# Patient Record
Sex: MALE | Race: WHITE
[De-identification: names, ages, dates, MRNs, and addresses within clinical notes are randomized per-mention and may not be internally consistent; named-entity substitution may affect disease eponyms.]

---

## 2017-02-05 ENCOUNTER — HOSPITAL ENCOUNTER (EMERGENCY)
Dept: HOSPITAL 17 - PHED | Age: 67
Discharge: HOME | End: 2017-02-05
Payer: MEDICARE

## 2017-02-05 VITALS — WEIGHT: 258.16 LBS | HEIGHT: 71 IN | BODY MASS INDEX: 36.14 KG/M2

## 2017-02-05 VITALS
TEMPERATURE: 99.2 F | HEART RATE: 67 BPM | RESPIRATION RATE: 16 BRPM | SYSTOLIC BLOOD PRESSURE: 148 MMHG | OXYGEN SATURATION: 96 % | DIASTOLIC BLOOD PRESSURE: 81 MMHG

## 2017-02-05 VITALS — DIASTOLIC BLOOD PRESSURE: 79 MMHG | SYSTOLIC BLOOD PRESSURE: 147 MMHG

## 2017-02-05 DIAGNOSIS — I10: ICD-10-CM

## 2017-02-05 DIAGNOSIS — Z79.01: ICD-10-CM

## 2017-02-05 DIAGNOSIS — J44.9: ICD-10-CM

## 2017-02-05 DIAGNOSIS — E78.00: ICD-10-CM

## 2017-02-05 DIAGNOSIS — M54.5: Primary | ICD-10-CM

## 2017-02-05 DIAGNOSIS — K21.9: ICD-10-CM

## 2017-02-05 DIAGNOSIS — E11.9: ICD-10-CM

## 2017-02-05 DIAGNOSIS — Z87.891: ICD-10-CM

## 2017-02-05 DIAGNOSIS — G89.29: ICD-10-CM

## 2017-02-05 PROCEDURE — 99283 EMERGENCY DEPT VISIT LOW MDM: CPT

## 2017-02-05 PROCEDURE — 96372 THER/PROPH/DIAG INJ SC/IM: CPT

## 2017-02-05 NOTE — PD
HPI


Chief Complaint:  Pain: Acute or Chronic


Time Seen by Provider:  19:31


Travel History


International Travel<30 days:  No


Contact w/Intl Traveler<30days:  No


Traveled to known affect area:  No





History of Present Illness


HPI


The patient is a 67-year-old male with a history of chronic back pain who 

complains of left back pain radiating to the left abdominal area.  The patient 

has had multiple MRIs, his latest was in November of the back and nothing 

surgical, be done about this.  The patient states he took 2 hydrocodone tablets 

today and the pain is starting to go away, the hydrocodone is taking effect.  

He is followed by Dr. Queen for this pain.  The hydrocodone was written by 

Dr. Hossein Hughes.  The patient has been seen multiple times in the last few 

months for this pain.





PFSH


Past Medical History


Hx Anticoagulant Therapy:  Yes (asa 81mg)


Arthritis:  Yes


Anxiety:  Yes


Depression:  No


Heart Rhythm Problems:  No


Cancer:  No


Cardiovascular Problems:  Yes (htn on med)


High Cholesterol:  Yes


Chemotherapy:  No


Chest Pain:  No


COPD:  Yes


Cerebrovascular Accident:  No


Diabetes:  Yes (type 2)


Patient Takes Glucophage:  No


Diminished Hearing:  No


Endocrine:  Yes


Gastrointestinal Disorders:  Yes


GERD:  Yes


Glaucoma:  No


Genitourinary:  No


Hepatitis:  No


Hiatal Hernia:  No


Hypertension:  Yes


Musculoskeletal:  Yes (scoliosis, DDD)


Neurologic:  Yes


Psychiatric:  Yes


Reproductive:  No


Respiratory:  Yes (copd)


Integumentary:  No


Immunizations Current:  Yes


Thyroid Disease:  No


Ulcer:  Yes


Tetanus Vaccination:  > 5 Years





Past Surgical History


Hysterectomy:  No


Thoracic Surgery:  No


Other Surgery:  Yes (COLONOSCOPY NOV 1ST)





Social History


Alcohol Use:  Yes (OCCASSIONAL)


Tobacco Use:  No (quit July, 2016)


Substance Use:  No





Allergies-Medications


(Allergen,Severity, Reaction):  


Coded Allergies:  


     No Known Allergies (Unverified , 2/5/17)


Reported Meds & Prescriptions





Reported Meds & Active Scripts


Active


Lortab (Hydrocodone-Acetaminophen) 5-325 Mg Tab 1 Tab PO Q6H PRN


Reported


Alprazolam 0.5 Mg Tab 0.5 Mg PO HS PRN


Losartan (Losartan Potassium) 100 Mg Tab 100 Mg PO DAILY


Norvasc (Amlodipine Besylate) 5 Mg Tab 5 Mg PO DAILY


Aspirin 81 Mg Chew 81 Mg CHEW DAILY


Multi Complete (Multiple Vitamins W/ Minerals) 1 Cap Cap 1 Cap PO DAILY


Niacin 500 Mg Tab 500 Mg PO TID


Fenofibrate 48 Mg Tab 160 Mg PO HS


Omeprazole 20 Mg Tab 20 Mg PO HS


Symbicort Inh (Budesonide/Formoterol Fumarate) 160-4.5 Mcg/Act Aero 2 Puff INH 

Q12HR


Glimepiride 4 Mg Tab 4 Mg PO DAILY


     Take with breakfast or first main meal


Hydrochlorothiazide 25 Mg Tab 25 Mg PO DAILY


Diclofenac Sodium DR (Diclofenac Sodium) 75 Mg Tabdr 75 Mg PO BID


Metoprolol Tartrate 100 Mg Tab 50 Mg PO BID


Pravastatin 40 Mg Tab 40 Mg PO HS








Review of Systems


Except as stated in HPI:  all other systems reviewed are Neg





Physical Exam


Narrative


GENERAL: The patient is alert, oriented 3 in moderate apparent distress with 

his back pain.  His blood pressure is 148/81, temperature 99.2 but the rest the 

vital signs are normal.


SKIN: Warm and dry.  No skin rash is seen.  There is an apparent lipoma/fatty 

tumor on the back that the wife is concerned about that the patient has no 

symptoms with this.


HEAD: Atraumatic. Normocephalic. 


EYES: Pupils equal and round. No scleral icterus. No injection or drainage. 


ENT: No nasal bleeding or discharge.  Mucous membranes pink and moist.


NECK: Trachea midline. No JVD. 


CARDIOVASCULAR: Regular rate and rhythm.  No murmur appreciated.


RESPIRATORY: No accessory muscle use. Clear to auscultation. Breath sounds 

equal bilaterally. 


GASTROINTESTINAL: Abdomen soft, non-tender, nondistended. Hepatic and splenic 

margins not palpable. 


MUSCULOSKELETAL: No obvious deformities. No clubbing.  No cyanosis.  No edema.  

There is tenderness over the left back diffusely, well away from the thoracic 

or lumbar spine.  No deformities noted.  Straight leg raising is normal


NEUROLOGICAL: Awake and alert. No obvious cranial nerve deficits.  Motor 

grossly within normal limits. Normal speech.


PSYCHIATRIC: Appropriate mood and affect; insight and judgment normal.





Data


Data


Last Documented VS





Vital Signs








  Date Time  Temp Pulse Resp B/P Pulse Ox O2 Delivery O2 Flow Rate FiO2


 


2/5/17 18:31 99.2 67 16 148/81 96   











MDM


Medical Decision Making


Medical Screen Exam Complete:  Yes


Emergency Medical Condition:  Yes


Medical Record Reviewed:  Yes


Differential Diagnosis


Acute exacerbation of chronic back pain, herniated nucleus pulposus


Narrative Course


The patient has acute exacerbation of chronic back pain.  Apparently, there is 

no surgical solution for this and the patient will need to follow-up with Dr. Queen for pain control.





Plan: The patient can take the medication written by Dr. Hughes.  I will add 

Flexeril and Mobic to his pain regimen.  He needs to follow-up with Dr. Queen.





***Additional Instructions:


As we discussed, follow-up with Dr. Queen.  The Flexeril is 3 times daily 

and the Mobic is once daily.


***Med/Other Pt SpecificInfo:  Prescription(s) given


Scripts


Cyclobenzaprine (Flexeril)10 Mg Tab10 Mg PO TID  #60 TAB  Ref 0


   Prov:Moris Pisano MD         2/5/17 


Meloxicam (Mobic)15 Mg Tab15 Mg PO DAILY  #30 TAB  Ref 0


   Prov:Moris Pisano MD         2/5/17


Disposition:  01 DISCHARGE HOME


Condition:  Stable








Mrois Pisano MD Feb 5, 2017 20:06

## 2017-03-13 ENCOUNTER — HOSPITAL ENCOUNTER (EMERGENCY)
Dept: HOSPITAL 17 - NEPC | Age: 67
Discharge: HOME | End: 2017-03-13
Payer: MEDICARE

## 2017-03-13 VITALS
HEART RATE: 78 BPM | OXYGEN SATURATION: 95 % | SYSTOLIC BLOOD PRESSURE: 167 MMHG | RESPIRATION RATE: 15 BRPM | DIASTOLIC BLOOD PRESSURE: 74 MMHG | TEMPERATURE: 97.9 F

## 2017-03-13 VITALS
RESPIRATION RATE: 18 BRPM | DIASTOLIC BLOOD PRESSURE: 76 MMHG | OXYGEN SATURATION: 98 % | SYSTOLIC BLOOD PRESSURE: 148 MMHG | HEART RATE: 74 BPM

## 2017-03-13 VITALS — HEIGHT: 71 IN | WEIGHT: 253.53 LBS | BODY MASS INDEX: 35.49 KG/M2

## 2017-03-13 DIAGNOSIS — L53.9: ICD-10-CM

## 2017-03-13 DIAGNOSIS — Z86.69: ICD-10-CM

## 2017-03-13 DIAGNOSIS — Z79.4: ICD-10-CM

## 2017-03-13 DIAGNOSIS — E11.9: ICD-10-CM

## 2017-03-13 DIAGNOSIS — Z87.09: ICD-10-CM

## 2017-03-13 DIAGNOSIS — R60.0: Primary | ICD-10-CM

## 2017-03-13 DIAGNOSIS — Z86.59: ICD-10-CM

## 2017-03-13 DIAGNOSIS — Z87.19: ICD-10-CM

## 2017-03-13 DIAGNOSIS — Z87.39: ICD-10-CM

## 2017-03-13 DIAGNOSIS — Z79.82: ICD-10-CM

## 2017-03-13 DIAGNOSIS — E78.5: ICD-10-CM

## 2017-03-13 DIAGNOSIS — I10: ICD-10-CM

## 2017-03-13 DIAGNOSIS — Z87.891: ICD-10-CM

## 2017-03-13 LAB
ANION GAP SERPL CALC-SCNC: 9 MEQ/L (ref 5–15)
BASOPHILS # BLD AUTO: 0.1 TH/MM3 (ref 0–0.2)
BASOPHILS NFR BLD: 1 % (ref 0–2)
BUN SERPL-MCNC: 27 MG/DL (ref 7–18)
CHLORIDE SERPL-SCNC: 104 MEQ/L (ref 98–107)
EOSINOPHIL # BLD: 0.1 TH/MM3 (ref 0–0.4)
EOSINOPHIL NFR BLD: 1.9 % (ref 0–4)
ERYTHROCYTE [DISTWIDTH] IN BLOOD BY AUTOMATED COUNT: 12.9 % (ref 11.6–17.2)
GFR SERPLBLD BASED ON 1.73 SQ M-ARVRAT: 48 ML/MIN (ref 89–?)
HCO3 BLD-SCNC: 24.8 MEQ/L (ref 21–32)
HCT VFR BLD CALC: 40.6 % (ref 39–51)
HEMO FLAGS: (no result)
LYMPHOCYTES # BLD AUTO: 1.9 TH/MM3 (ref 1–4.8)
LYMPHOCYTES NFR BLD AUTO: 32.3 % (ref 9–44)
MCH RBC QN AUTO: 29.9 PG (ref 27–34)
MCHC RBC AUTO-ENTMCNC: 34.7 % (ref 32–36)
MCV RBC AUTO: 86.3 FL (ref 80–100)
MONOCYTES NFR BLD: 6.6 % (ref 0–8)
NEUTROPHILS # BLD AUTO: 3.5 TH/MM3 (ref 1.8–7.7)
NEUTROPHILS NFR BLD AUTO: 58.2 % (ref 16–70)
PLATELET # BLD: 283 TH/MM3 (ref 150–450)
POTASSIUM SERPL-SCNC: 4.1 MEQ/L (ref 3.5–5.1)
RBC # BLD AUTO: 4.7 MIL/MM3 (ref 4.5–5.9)
SODIUM SERPL-SCNC: 138 MEQ/L (ref 136–145)
WBC # BLD AUTO: 5.9 TH/MM3 (ref 4–11)

## 2017-03-13 PROCEDURE — 80048 BASIC METABOLIC PNL TOTAL CA: CPT

## 2017-03-13 PROCEDURE — 93971 EXTREMITY STUDY: CPT

## 2017-03-13 PROCEDURE — 85025 COMPLETE CBC W/AUTO DIFF WBC: CPT

## 2017-03-13 NOTE — RADRPT
EXAM DATE/TIME:  03/13/2017 14:42 

 

[facility] COMPARISON:     

No previous studies available for comparison.

 

EXTERNAL COMPARISON :    

[facility], US LEG, LEFT VENOUS DOPPLER, March 7, 2017

 

 

INDICATIONS :                

Left leg pain. 

            

 

MEDICAL HISTORY :     

Hypercholesterolemia.  Hypertension.  Chronic obstructive pulmonary disease.  Anticoagulant therapy. 
Dyspnea. Emphysema. GERD. Arthritis. Diabetes.  

 

SURGICAL HISTORY :         

Colonoscopy. 

 

ENCOUNTER:     

Subsequent

 

ACUITY:     

1 week

 

PAIN SCORE:      

2/10

 

LOCATION:      

Left  leg. 

            

                      

 

TECHNIQUE:     

Venous ultrasound of the leg was performed from the inguinal ligament to the proximal calf.  Real-bola
e, color Doppler and spectral tracing, compression and augmentation techniques were used.  

 

FINDINGS:     

There is normal compressibility of the deep venous system from the inguinal region to the proximal ca
lf.  No echogenic clot is seen in the lumen of the common femoral, femoral, popliteal, and posterior 
tibial veins.  There is a normal response of the venous system to proximal and distal augmentation an
d respiration.  

 

CONCLUSION:     No DVT.

 

 

 

 Semaj Herman MD on March 13, 2017 at 15:36           

Board Certified Radiologist.

 This report was verified electronically.

## 2017-03-13 NOTE — PD
HPI


Chief Complaint:  Edema


Time Seen by Provider:  14:36


Travel History


International Travel<30 days:  No


Contact w/Intl Traveler<30days:  No


Traveled to known affect area:  No





History of Present Illness


HPI


Patient is a 67-year-old male presenting to emergency department evaluation of 

left lower extremity swelling and redness.  Patient states this has been 

ongoing for 10 days, he was prescribed Bactrim and indomethacin per his primary 

doctor.  His primary care provider is Dr. Hossein Hughes.  Patient was told 

initially that it was cellulitis and prescribed Bactrim then he was told it was 

gout and prescribed indomethacin.  He states he had an ultrasound performed at 

[facility] last Thursday or Friday which was negative per his report.  

He denies any significant pain.  He states that his toes were much more swollen 

than they currently are, he was told by his endocrinologist that he did not 

have any fungal infection on his feet.  Patient reports that the swelling is 

better first thing in the morning and gets worse by the end of the day.  His 

past medical history includes type 2 diabetes, currently insulin-dependent.  

Hypertension, hyperlipidemia, COPD.





PFSH


Past Medical History


Hx Anticoagulant Therapy:  Yes (asa 81mg)


Arthritis:  Yes


Anxiety:  Yes


Depression:  No


Heart Rhythm Problems:  No


Cancer:  No


Cardiovascular Problems:  Yes (htn on med)


High Cholesterol:  Yes


Chemotherapy:  No


Chest Pain:  No


COPD:  Yes


Cerebrovascular Accident:  No


Diabetes:  Yes


Diminished Hearing:  No


Endocrine:  Yes


Gastrointestinal Disorders:  Yes


GERD:  Yes


Glaucoma:  No


Genitourinary:  No


Hepatitis:  No


Hiatal Hernia:  No


Hypertension:  Yes


Musculoskeletal:  Yes (scoliosis, DDD)


Neurologic:  Yes


Psychiatric:  Yes


Reproductive:  No


Respiratory:  Yes (copd)


Integumentary:  No


Immunizations Current:  Yes


Thyroid Disease:  No


Ulcer:  Yes





Past Surgical History


Hysterectomy:  No


Thoracic Surgery:  No


Other Surgery:  Yes (COLONOSCOPY NOV 1ST)





Social History


Alcohol Use:  Yes (OCCASSIONAL)


Tobacco Use:  No (quit July, 2016)


Substance Use:  No





Allergies-Medications


(Allergen,Severity, Reaction):  


Coded Allergies:  


     No Known Allergies (Unverified , 3/13/17)


Reported Meds & Prescriptions





Reported Meds & Active Scripts


Active


Flexeril (Cyclobenzaprine HCl) 10 Mg Tab 10 Mg PO TID


Mobic (Meloxicam) 15 Mg Tab 15 Mg PO DAILY


Lortab (Hydrocodone-Acetaminophen) 5-325 Mg Tab 1 Tab PO Q6H PRN


Reported


Alprazolam 0.5 Mg Tab 0.5 Mg PO HS PRN


Losartan (Losartan Potassium) 100 Mg Tab 100 Mg PO DAILY


Norvasc (Amlodipine Besylate) 5 Mg Tab 5 Mg PO DAILY


Aspirin 81 Mg Chew 81 Mg CHEW DAILY


Multi Complete (Multiple Vitamins W/ Minerals) 1 Cap Cap 1 Cap PO DAILY


Niacin 500 Mg Tab 500 Mg PO TID


Fenofibrate 48 Mg Tab 160 Mg PO HS


Omeprazole 20 Mg Tab 20 Mg PO HS


Symbicort Inh (Budesonide/Formoterol Fumarate) 160-4.5 Mcg/Act Aero 2 Puff INH 

Q12HR


Glimepiride 4 Mg Tab 4 Mg PO DAILY


     Take with breakfast or first main meal


Hydrochlorothiazide 25 Mg Tab 25 Mg PO DAILY


Diclofenac Sodium DR (Diclofenac Sodium) 75 Mg Tabdr 75 Mg PO BID


Metoprolol Tartrate 100 Mg Tab 50 Mg PO BID


Pravastatin 40 Mg Tab 40 Mg PO HS








Review of Systems


Except as stated in HPI:  all other systems reviewed are Neg


Cardiovascular:  Positive: Edema,  No: Chest Pain or Discomfort


Respiratory:  No: Shortness of Breath


Skin:  Positive Rash, Positive Change in Pigmentation





Physical Exam


Narrative


GENERAL: Obese, well-developed, alert  male.  Resting comfortably in 

no acute distress.


SKIN: Warm and dry. 


HEAD: Atraumatic. Normocephalic. 


EYES: Pupils equal and round. No scleral icterus. No injection or drainage. 


ENT: No nasal bleeding or discharge.  Mucous membranes pink and moist.


NECK: Trachea midline. No JVD. 


CARDIOVASCULAR: Regular rate and rhythm.  No murmur appreciated.


RESPIRATORY: No accessory muscle use. Clear to auscultation. Breath sounds 

equal bilaterally. 


GASTROINTESTINAL: Abdomen soft, non-tender, nondistended. Hepatic and splenic 

margins not palpable. 


MUSCULOSKELETAL: No obvious deformities. No clubbing.  No cyanosis.  1+ edema 

to left lower extremity, erythema noted.  Flaking dry skin to the interdigital 

spaces on the left foot as well as the medial aspect of the left foot area the 

pedal pulses, brisk capillary refill.


NEUROLOGICAL: Awake and alert. No obvious cranial nerve deficits.  Motor 

grossly within normal limits. Normal speech.


PSYCHIATRIC: Appropriate mood and affect; insight and judgment normal.





Data


Data


Last Documented VS





Vital Signs








  Date Time  Temp Pulse Resp B/P Pulse Ox O2 Delivery O2 Flow Rate FiO2


 


3/13/17 16:44  74 18 148/76 98 Room Air  


 


3/13/17 13:09 97.9       








Orders





 Us Leg Venous Doppler (3/13/17 )


Complete Blood Count With Diff (3/13/17 14:34)


Basic Metabolic Panel (Bmp) (3/13/17 14:34)





Labs








 Laboratory Tests








Test 3/13/17





 15:35


 


White Blood Count 5.9 TH/MM3


 


Red Blood Count 4.70 MIL/MM3


 


Hemoglobin 14.1 GM/DL


 


Hematocrit 40.6 %


 


Mean Corpuscular Volume 86.3 FL


 


Mean Corpuscular Hemoglobin 29.9 PG


 


Mean Corpuscular Hemoglobin 34.7 %





Concent 


 


Red Cell Distribution Width 12.9 %


 


Platelet Count 283 TH/MM3


 


Mean Platelet Volume 7.2 FL


 


Neutrophils (%) (Auto) 58.2 %


 


Lymphocytes (%) (Auto) 32.3 %


 


Monocytes (%) (Auto) 6.6 %


 


Eosinophils (%) (Auto) 1.9 %


 


Basophils (%) (Auto) 1.0 %


 


Neutrophils # (Auto) 3.5 TH/MM3


 


Lymphocytes # (Auto) 1.9 TH/MM3


 


Monocytes # (Auto) 0.4 TH/MM3


 


Eosinophils # (Auto) 0.1 TH/MM3


 


Basophils # (Auto) 0.1 TH/MM3


 


CBC Comment DIFF FINAL 


 


Differential Comment  


 


Sodium Level 138 MEQ/L


 


Potassium Level 4.1 MEQ/L


 


Chloride Level 104 MEQ/L


 


Carbon Dioxide Level 24.8 MEQ/L


 


Anion Gap 9 MEQ/L


 


Blood Urea Nitrogen 27 MG/DL


 


Creatinine 1.47 MG/DL


 


Estimat Glomerular Filtration 48 ML/MIN





Rate 


 


Random Glucose 171 MG/DL


 


Calcium Level 9.2 MG/DL














MDM


Medical Decision Making


Medical Screen Exam Complete:  Yes


Emergency Medical Condition:  Yes


Interpretation(s)





Vital Signs








  Date Time  Temp Pulse Resp B/P Pulse Ox O2 Delivery O2 Flow Rate FiO2


 


3/13/17 13:09 97.9 78 15 167/74 95   








Differential Diagnosis


DVT versus cellulitis versus venous insufficiency versus tinea versus other


Narrative Course


Patient is a 67-year-old male presenting to the emergency department for 

evaluation of left lower extremity erythema and edema.  Patient's been 

evaluated by his primary doctor and he is currently on Bactrim with 1 day left 

in the prescribed course of therapy.  He does report some improvement in the 

swelling to his left lower leg.  Physical exam is consistent with a tinea to 

the left foot and interdigital spaces.  Ultrasound is ordered and pending.  We'

ll check CBC and BMP as well.  Patient states that he was told by his primary 

to come to the emergency department however when asked he states that the nurse 

told him he would be able to get labs performed quickly here.





Workup initiated in triage, care of patient will be transferred to provider 

with a medical bed is available.








Maria Garcia Mar 13, 2017 14:40

## 2018-05-17 ENCOUNTER — HOSPITAL ENCOUNTER (OUTPATIENT)
Dept: HOSPITAL 17 - HDOC | Age: 68
Discharge: HOME | End: 2018-05-17
Attending: INTERNAL MEDICINE
Payer: MEDICARE

## 2018-05-17 VITALS
TEMPERATURE: 98.5 F | SYSTOLIC BLOOD PRESSURE: 157 MMHG | DIASTOLIC BLOOD PRESSURE: 79 MMHG | HEART RATE: 53 BPM | RESPIRATION RATE: 18 BRPM | OXYGEN SATURATION: 91 %

## 2018-05-17 VITALS — WEIGHT: 266.32 LBS | BODY MASS INDEX: 37.28 KG/M2 | HEIGHT: 71 IN

## 2018-05-17 DIAGNOSIS — E11.9: ICD-10-CM

## 2018-05-17 DIAGNOSIS — I25.10: Primary | ICD-10-CM

## 2018-05-17 DIAGNOSIS — I10: ICD-10-CM

## 2018-05-17 DIAGNOSIS — Z79.84: ICD-10-CM

## 2018-05-17 DIAGNOSIS — J44.9: ICD-10-CM

## 2018-05-17 LAB
BUN SERPL-MCNC: 34 MG/DL (ref 7–18)
CALCIUM SERPL-MCNC: 9.7 MG/DL (ref 8.5–10.1)
CHLORIDE SERPL-SCNC: 103 MEQ/L (ref 98–107)
CREAT SERPL-MCNC: 1.42 MG/DL (ref 0.6–1.3)
GFR SERPLBLD BASED ON 1.73 SQ M-ARVRAT: 50 ML/MIN (ref 89–?)
GLUCOSE SERPL-MCNC: 189 MG/DL (ref 74–106)
HCO3 BLD-SCNC: 24.8 MEQ/L (ref 21–32)
SODIUM SERPL-SCNC: 139 MEQ/L (ref 136–145)

## 2018-05-17 PROCEDURE — 83880 ASSAY OF NATRIURETIC PEPTIDE: CPT

## 2018-05-17 PROCEDURE — 80048 BASIC METABOLIC PNL TOTAL CA: CPT

## 2018-05-17 PROCEDURE — C1887 CATHETER, GUIDING: HCPCS

## 2018-05-17 PROCEDURE — C1874 STENT, COATED/COV W/DEL SYS: HCPCS

## 2018-05-17 PROCEDURE — 82810 BLOOD GASES O2 SAT ONLY: CPT

## 2018-05-17 PROCEDURE — 92928 PRQ TCAT PLMT NTRAC ST 1 LES: CPT

## 2018-05-17 PROCEDURE — 99152 MOD SED SAME PHYS/QHP 5/>YRS: CPT

## 2018-05-17 PROCEDURE — 85002 BLEEDING TIME TEST: CPT

## 2018-05-17 PROCEDURE — C1760 CLOSURE DEV, VASC: HCPCS

## 2018-05-17 PROCEDURE — 93456 R HRT CORONARY ARTERY ANGIO: CPT

## 2018-05-17 PROCEDURE — C1769 GUIDE WIRE: HCPCS

## 2018-05-17 PROCEDURE — C1725 CATH, TRANSLUMIN NON-LASER: HCPCS

## 2018-05-17 PROCEDURE — C1893 INTRO/SHEATH, FIXED,NON-PEEL: HCPCS

## 2018-05-17 NOTE — CATHPROC
[employer] HIS Report

Study Information

Study Number    Admission           Scheduled Start             Study Start

 

[id number]    May 17 2018 6:32AM      05/17/2018               May 17 2018 7:49AM

 

Universal Service

 

Cardiac Catheterization

 

Admit Source               Facility Department

 

Other                   [facility] - Cath Lab

 

Physician and Clinical Staff

Initial MD Cortes, Zay         Circyamileth Blanc RN, Seven

 

                         Recorder      Yamileth Gallegos ,RT(R)

 

                         Scrub        Keyanna Torres,RT(R) (BS)

 

Procedures Performed

Procedure                     Location (Site)            Vessel Name

 

Coronary Angiograms                 LCA                 Left Coronary

Coronary Angiograms                 RCA                 Right Coronary

Drug Eluting Inflatio               LAD Mid                Left Coronary



PTCA                       LAD Mid                Left Coronary

Wire insertion                   Fem Art (right)           Femoral Art

Equipment

Time            Description           Size       Mfg Part Number  Used/Scraped

                                            [id number]

07:50    [employer]      SWAN HELDER CATHETER       FR 7                Used

                                            *[id number]

                   TRANSDUCER, TRUWAVE               [id number]

07:50    BUENO ROSEN                      *                 Used

                   W/STOCKCOCK                   *[id number]

                   TRANSDUCER, TRUWAVE               [id number]

07:50    BUENO ROSEN                      *                 Used

                   W/STOCKCOCK                   *[id number]

                                            [id number]

09:27    [employer]    WIRE, LUGE 182CM        182CM               Used

                                            *[id number]

                                            [id number]

10:03    [employer]     VASCADE, FR6 CLOSURE SYSTEM FR 6\7                 Used

                                            *[id number]

                                            [phone]



                                            *[id number]

                                            [phone]



                                            *[id number]

                                            [phone]



                                            *[id number]

                                            [id number]

07:50    [employer]    PACK, CCL CUSTOM        *                 Used

                                            *[id number]

                                            [id number]

07:50    [employer]      PEN, SKIN DUAL W/ RULER     *                 Used

                                            *[id number]

                   BALLOON, 2.5 X 12MM NC              [id number]

09:39    [employer]                        12MM                Used

                   EUPHORA                     *[id number]

                   BALLOON, 2.75 X 15MM NC             [id number]

09:53    [employer]                        15MM                Used

                   EUPHORA                     *[id number]

                                            [id number]

09:45    [employer]        STENT, 2.5 18MM CAT      2.5 18MM              Used

                                            *[id number]

                                            [id number]

09:42    [employer]      30 MICKI INDEFLATOR                         Used

                                            *[id number]

                                            [id number]

07:50    [employer]      SHEATH, FR6.5 PRELUDE 11CM   FR 6.5      038ACT      Used

                                            *[id number]

                                            [id number]

07:50    [employer]      WIRE, 3MMJ .035 180CM      180CM               Used

                                            *[id number]

                                            [id number]

07:50    [employer]          MANIFOLD, 2 PORT        *                 Used

                                            *[id number]

                                            [id number]

07:50    [employer]          MANIFOLD, 4 PORT        *                 Used

                                            *[id number]

07:50    [employer]         OMNIPAQUE, 350 MG, 150ML    150ML      [id number]      Used

 

08:35    [employer]         OMNIPAQUE, 350 MG, 150ML    150ML      [id number]      Used

 

10:01    [employer]         OMNIPAQUE, 350 MG, 50ML     50ML       [id number]      Used

                                            [id number]

07:50    [employer]      BLANKET,WARM AIR CCL      *                 Used

                                            *[id number]

                                            [id number]

07:50    [employer]      SHEATH, FR7 TERUMO (10CM)    FR 7                Used

                                            *[id number]

 

Equipment Model, Serial, Lot Number and Expiration Data

Description              Model Number      Serial Number   Lot Number       Expiration Date

 

STENT, 2.5 18MM CAT         [id number]                [id number]       02-

 

 

History: Current Medications

Medication         Dosage/Unit       Route      Frequency  Last Date/Time Taken

 

Beta Blocker

 

ASA

 

NORVASC

History: Allergies

Allergy               Reaction

 

No Known Allergies

 

ACE Inhibitors           COUGH

 

 

History: Risk Factors

                       Family History of

Hypertension     Dyslipidemia                Previous MI    Previous Heart Failure

                       Premature CAD

Yes         Yes           No         No        No

Prior Valve

           Prior PCI        Prior CABG

Surgery

No          No           No

           Cerebrovascular     Peripheral Artery  Chronic Lung

On Dialysis                                    Diabetes   Diabetes Therapy

           Disease         Disease       Disease

No          No           No         Yes        Yes      Insulin

 

 

History: Stress Tests

Stress or Imaging Studies Performed

 

No

 

 

Labs

Hgb (g/dl)        Hct (%)         WBC (l/cumm)     Platelets (thousands)

 

11.60-17.00        35.00-51.00       4.00-11.00      150..00

 

12.9           40.6          7.5         223

 

Creatinine (mg/dl)

0.50-1.30

 

1.4

 

CPK-MB (ng/ML)

 

0.50-3.60

 

Not Drawn

 

 

 

 

Medication

Medication Total Dose (Bolus/Oral)

Medication             Total Dosage/Unit

 

1% XYLOCAINE                20 mL

 

FENTANYL                  50 mcg

 

HEPARIN                 81637 units

 

PLAVIX                  600 mg

 

VERSED                   2 mg

Medications (Bolus/Oral)

Medication          Time Given           Dosage/Unit       Administered By      Reason

 

VERSED            5/17/2018 8:52:43 AM       2 mg         Seven Blanc RN

2 mg VERSED given in lab by Seven Blanc RN in Left Hand via Peripheral IV. Ordered by Sylvain Cortes.

 

FENTANYL           5/17/2018 8:53:36 AM      25 mcg         Seven Blanc RN

25 mcg FENTANYL given in lab by Seven Blanc RN in Left Hand via Peripheral IV. Ordered by Zay Cortes.

 

1% XYLOCAINE         5/17/2018 8:54:00 AM      20 mL         Zay Cortes

20 mL 1% XYLOCAINE given in lab by Zay Cortes in Right Groin via Subcutaneous. Ordered by Zay Combs.

 

FENTANYL           5/17/2018 9:00:00 AM      25 mcg         Seven Blanc RN

25 mcg FENTANYL given in lab by Seven Blanc RN in Left Hand via Peripheral IV. Ordered by Zay Cortes.

 

HEPARIN            5/17/2018 9:26:25 AM     5000 units        Seven Blanc RN

5000 units HEPARIN given in lab by Seven Blanc RN in Left Hand via Peripheral IV. Ordered by Zay Brady.

 

HEPARIN            5/17/2018 9:36:33 AM     3000 units        Seven Blanc RN

3000 units HEPARIN given in lab by Seven Blanc RN via Peripheral IV. Ordered by Zay Cortes.

 

HEPARIN            5/17/2018 9:51:43 AM     2000 units        Seven Blanc RN

2000 units HEPARIN given in lab by Seven Blanc RN in Left Hand via Peripheral IV. Ordered by Zay Brady.

 

PLAVIX            5/17/2018 10:07:42 AM     600 mg         Seven Blanc RN

600 mg PLAVIX given in lab by Seven Blanc RN via Oral. Ordered by Zay Cortes.

 

Medication (Drip)

Medication          Time Given           Dosage/Unit      Concentration/Unit  Diluent (ml)  Solution

 

IV Solutions         5/17/2018 8:22:01 AM      50 mL (IV)                        NaCl .9

Patient arrived on IV Solutions in Left Hand via Peripheral IV. Pump/Drip Flow using NaCl .9.

Initial Case Assessment

Cardiovascular

HR          Rhythm         NIBP

 

56          sr           134/72

 

Edema Present     Skin color           Skin

 

Mild         Normal             Warm Dry

 

Circulatory - Right Pulses

Dorsalis Pedis    Femoral

 

2           2

 

Scale (0,1,2,3,4,d)

 

Circulatory - Left Pulses

Dorsalis Pedis    Femoral

 

2           2

 

Scale (0,1,2,3,4,d)

 

Neurological State

           Oriented to time-place-

Alert                     Moves all extremities

           person

 

Respiration - General

Respiration Rate

           SpO2 (%)

(B/min)

19          97

Final Case Assessment

Cardiovascular

HR           Rhythm          NIBP

 

56           sr            134/72

 

Edema Present      Skin color           Skin

 

Mild           Normal             Warm Dry

 

Circulatory - Right Pulses

Dorsalis Pedis     Femoral

 

2            2

 

Scale (0,1,2,3,4,d)

 

Circulatory - Left Pulses

Dorsalis Pedis     Femoral

 

2            2

 

Scale (0,1,2,3,4,d)

 

Neurological State

            Oriented to time-place-

Alert                      Moves all extremities

            person

 

Respiration - General

Respiration Rate

            SpO2 (%)

(B/min)

19           97

 

Chronological Log

Time    Study Chronological Log

 

8:11:15   Patient arrived via Bed.

 

8:11:18   Patient Name, D.O.B, / Armband Verified By R.N.

 

8:21:49   Consent signed by the physician and the patient and verified by the Cath Lab staff.

 

8:21:51   Pre-op and post- op instructions given; patient acknowledges understanding of instructions.


 

8:21:52   Verbal Stimulation=2 Physical Stimulation=2 Airway=2 Respiration=2 TOTAL=8. (0=absent, 1=li
mited, 2=present)

 

8:21:54   Patient has been NPO for More than 6Hrs.

 

8:21:56   Skin Breakdown- none per patient

 

8:21:58   Patient Warmer Placed on the Table.

 

8:21:59   Morris Prominences Protected

 

8:22:00   A # 20 IV was noted in the Hand (left). Grade = 0

 

8:22:01   Patient arrived on IV Solutions in Left Hand via Peripheral IV. Pump/Drip Flow using NaCl .
9.

      Assessment: Initial Case, HR=56 BPM, Rhythm=sr, JLVZ=732/72 mmhg, Edema=Mild, Color=Normal, Ski
n = Warm,

      Dry

      Right Pulses: César Ped=2, Femoral=2

8:22:02

      Left Pulses: César Ped=2, Femoral=2

      Neurological: State=Alert, Ox3, BAH

      Respiration: Resp=19 B/min, SpO2=97 %

8:22:02   History and physical on the chart or being dictated.

      Vitals capture started with the following parameters, Patient=Adult, Interval=5 min, Initial Pr
xaleue=264 mmHg,

8:22:46

      Deflation Rate=5 mmHg, Cuff placed on Left Arm

8:23:35  HR=56 bpm, VFRY=644/72 mmhg, SpO2=96.0 %, Resp=19 B/min, Pain=0, Tamara=10, Lazaro=2

 

8:27:28  Bilateral groins prepped with 2% chlorhexidine, and draped after a 3 minute waiting time.

 

8:28:59  HR=57 bpm, APNH=193/75 mmhg, SpO2=92.0 %, Resp=17 B/min, Pain=0, Tamara=10, Lazaro=2

 

8:29:38  MD paged

 

8:29:41  Reference ECG taken

 

8:32:59  Pressure channel 1 zeroed.

 

8:33:29  HR=55 bpm, NTNL=368/78 mmhg, SpO2=94.0 %, Resp=14 B/min, Pain=0, Tamara=10, Lazaro=2

 

8:37:28  MD responded

 

8:38:30  HR=56 bpm, NWGK=577/74 mmhg, SpO2=95.0 %, Resp=21 B/min, Pain=0, Tamara=10, Lazaro=2

 

8:41:36  MD arrived.

 

8:44:00  HR=54 bpm, RMWX=648/79 mmhg, SpO2=95.0 %, Resp=21 B/min, Pain=0, Tamara=10, Lazaro=2

 

8:48:28  HR=54 bpm, OOYL=050/78 mmhg, SpO2=96 %, Resp=20 B/min, Pain=0, Tamara=10, Lazaro=2

     Time Out. Correct patient, correct procedure, correct physician, power injector not loaded with 
contrast with surgical

8:50:40

     team present. Time Out Concurred by MD and individual staff in procedure.

8:51:37  Case Start

 

8:52:43  2 mg VERSED given in lab by Seven Blanc RN in Left Hand via Peripheral IV. Ordered by Zay Combs.

 

8:53:29  HR=52 bpm, XOZZ=352/74 mmhg, SpO2=94.0 %, Resp=15 B/min, Pain=0, Tamara=10, Lazaro=2

 

8:53:36  25 mcg FENTANYL given in lab by Seven Blanc RN in Left Hand via Peripheral IV. Ordered by Zay Cordero.

 

8:54:00  20 mL 1% XYLOCAINE given in lab by Zay Cortes in Right Groin via Subcutaneous. Ordered
 by Zay Cortes.

 

8:55:43  Access site was Radial Artery.

 

8:55:56  A SHEATH, FR6.5 PRELUDE 11CM FR 6.5 was advanced into the Fem Art (right) using the Percutan
eous technique.

 

8:58:00  Holding Pressure.

 

8:58:30  HR=51 bpm, OJFS=931/72 mmhg, SpO2=93.0 %, Resp=18 B/min, Pain=0, Tamara=10, Lazaro=2

 

9:00:00  25 mcg FENTANYL given in lab by Seven Blanc RN in Left Hand via Peripheral IV. Ordered by Zay Cordero.

 

9:00:19  Access site was Right Femoral Vein.

 

9:00:27  A SHEATH, FR7 TERUMO (10CM) FR 7 was advanced into the Fem Vein (right) using the Percutaneo
us technique.

 

9:00:53  A SWAN HELDER CATHETER FR 7 was inserted via Fem Vein (right)

 

9:02:57  Pressure channel 1 zeroed.

     Recorded Pressure: RA, HR=50, Condition=Condition 1

9:03:18

     (Right Atrium) RA 20/10/10

9:03:31  HR=55 bpm, FYBK=826/65 mmhg, SpO2=87.0 %, Resp=13 B/min, Pain=0, Tamara=10, Lazaro=2

     Recorded Pressure: RV, HR=50, Condition=Condition 1

9:03:42

     (Right Ventricle) RV 32/7/9

     Recorded Pressure: PCW, HR=53, Condition=Condition 1

9:04:40

     (Pulmonary Capillary Wedge) PCW 13/13/11

     Recorded Pressure: MPA, HR=51, Condition=Condition 1

9:05:09

     (Main Pulmonary Artery) MPA 29/15/20

     Thermo CO: CO=5.5 l/m, HR=54 bpm, Condition=Condition 1. Used in calculation.

9:07:19  Equipment: Description and Size=SWAN HELDER CATHETER FR 7, Type=[place]ath Probe, CC=0.579

     Injectant: Temp=19.0 - 22.0 Celsius, Volume=10.0 ml

     Thermo CO: CO=6.7 l/m, HR=56 bpm, Condition=Condition 1. Used in calculation.

9:07:46  Equipment: Description and Size=SWAN HELDER CATHETER FR 7, Type=Bath Probe, CC=0.579

     Injectant: Temp=19.0 - 22.0 Celsius, Volume=10.0 ml

     Thermo CO: CO=5.2 l/m, HR=52 bpm, Condition=Condition 1. Used in calculation.

9:08:12  Equipment: Description and Size=SWAN HELDER CATHETER FR 7, Type=Bath Probe, CC=0.579

     Injectant: Temp=19.0 - 22.0 Celsius, Volume=10.0 ml

9:08:30  HR=53 bpm, SJIR=490/72 mmhg, SpO2=93.0 %, Resp=19 B/min

 

9:10:30  Saturation: Site=PA (Pulmonary Artery) , O2=68.4 %, Hgb=12.9 gm/dl, Condition=Condition 1. U
sed in calculation.

 

9:11:03  Saturation: Site=Ao (Aorta) , O2=93.3 %, Hgb=12.9 gm/dl, Condition=Condition 1. Used in calc
ulation.

 

9:11:55  Saturation: Site=RVapx (RV [place]) , O2=68.4 %, Hgb=12.9 gm/dl, Condition=Condition 1. Used in
 calculation.

 

9:13:00  Saturation: Site=RAhi (High Right Atrium) , O2=67.1 %, Hgb=12.9 gm/dl, Condition=Condition 1
. Used in calculation.

 

9:13:45  Saturation: Site=Jeannine (Mid Right Atrium) , O2=70.7 %, Hgb=12.9 gm/dl, Condition=Condition 1. 
Used in calculation.

 

9:14:02  HR=51 bpm, KIRT=430/70 mmhg, SpO2=94.0 %, Resp=13 B/min, Pain=0, Tamara=10, Lazaro=2

 

9:14:38  Saturation: Site=RAlo (Low Right Atrium) , O2=69.7 %, Hgb=12.9 gm/dl, Condition=Condition 1.
 Used in calculation.

 

9:14:53  [place] Helder Catheter Removed

     A JL 4.5 INFINITI CATHETER FR 6 was advanced over a wire. OMNIPAQUE, 350 MG, 150ML 150ML was use
d for

9:15:56

     injections.

     Recorded Pressure: Ao, HR=50, Condition=Condition 1

9:17:17

     (Aorta) Ao 123/56/81

9:17:46  The  LCA was injected and visualized at various angles. OMNIPAQUE, 350 MG, 150ML 150ML used.


 

9:18:33  HR=49 bpm, IKLR=087/73 mmhg, SpO2=92.0 %, Resp=17 B/min, Pain=0, Tamara=10, Lazaro=2

 

9:20:08  Catheter was removed

     A 3DRC INFINITI CATHETER FR 6 was advanced over a wire. OMNIPAQUE, 350 MG, 150ML 150ML was used 
for

9:20:22

     injections.

9:21:53  The  RCA was injected and visualized at various angles. OMNIPAQUE, 350 MG, 150ML 150ML used.


 

9:22:58  Catheter was removed

 

9:23:30  HR=67 bpm, JUSJ=583/77 mmhg, SpO2=92.0 %, Resp=18 B/min, Pain=0, Tamara=10, Lazaro=2

 

9:26:25  5000 units HEPARIN given in lab by Seven Blanc RN in Left Hand via Peripheral IV. Ordered b
Zay Durham.

     A XB 3.5 GUIDE CATHETER FR 6 was advanced over a wire. OMNIPAQUE, 350 MG, 150ML 150ML was used f
or

9:28:18

     injections.

9:28:33  HR=60 bpm, KVJW=429/71 mmhg, SpO2=93.0 %, Resp=16 B/min, Pain=0, Tamara=10, Lazaro=2

 

9:30:48  Activated Clotting Time Drawn

 

9:32:03  A WIRE, LUGE 182CM 182CM was inserted via Fem Art (right).

 

9:34:09  HR=52 bpm, DBIA=886/71 mmhg, SpO2=93.0 %, Resp=19 B/min, Pain=0, Tamara=10, Lazaro=2

 

9:35:42  ACT (Normal Range ) = 244

 

9:36:09  Wire removed for re shape

 

9:36:27  A WIRE, LUGE 182CM 182CM was inserted via Fem Art (right).

 

9:36:33  3000 units HEPARIN given in lab by Seven Blanc RN via Peripheral IV. Ordered by Zay Cortes.

 

9:38:27  Interventional wire has crossed the lesion

 

9:38:35  HR=57 bpm, BGSU=669/79 mmhg, SpO2=93.0 %, Resp=18 B/min, Pain=0, Tamara=10, Lazaro=2

 

9:39:14  A BALLOON, 2.5 X 12MM NC EUPHORA 12MM was inserted over WIRE, LUGE 182CM 182CM via the Fem A
rt (right).

     A BALLOON, 2.5 X 12MM NC EUPHORA 12MM over a WIRE, LUGE 182CM 182CM in the LAD Mid was inflated 
using a 30

9:41:21

     MICKI INDEFLATOR at 12 micki for 60 sec.

     A BALLOON, 2.5 X 12MM NC EUPHORA 12MM over a WIRE, LUGE 182CM 182CM in the LAD Mid was inflated 
using a 30

9:42:51

     MICKI INDEFLATOR at 12 micki for 40 sec.

9:43:32  HR=59 bpm, PGZA=662/76 mmhg, SpO2=93.0 %, Resp=19 B/min, Pain=0, Tamara=10, Lazaro=2

 

9:43:52  Balloon Removed.

 

9:44:44  Activated Clotting Time Drawn

     A STENT, 2.5 18MM CAT 2.5 18MM was advanced through a XB 3.5 GUIDE CATHETER FR 6 over a WIRE, L
UGE 182CM

9:45:19

     182CM.

      A STENT, 2.5 18MM CAT 2.5 18MM was deployed using a 30 MICKI INDEFLATOR at 16 atmospheres for 30
 seconds in

9:46:29

      the LAD Mid.

9:47:50  Delivery device removed

 

9:48:35  HR=55 bpm, KJLX=747/79 mmhg, SpO2=93.0 %, Resp=19 B/min, Pain=0, Tamara=10, Lazaro=2

 

9:49:13  A BALLOON, 2.5 X 12MM NC EUPHORA 12MM was inserted over WIRE, LUGE 182CM 182CM via the Fem A
rt (right).

      A BALLOON, 2.5 X 12MM NC EUPHORA 12MM over a WIRE, LUGE 182CM 182CM in the LAD Mid was inflated
 using a 30

9:50:24

      MICKI INDEFLATOR at 20 micki for 35 sec.

9:50:58  ACT (Normal Range ) = ~ACT~

 

9:51:43  2000 units HEPARIN given in lab by Seven Blanc RN in Left Hand via Peripheral IV. Ordered b
y Zay Cortes.

 

9:52:30  Balloon Removed.

 

9:53:36  HR=56 bpm, SNPS=041/82 mmhg, SpO2=93.0 %, Resp=19 B/min, Pain=0, Tamara=10, Lazaro=2

 

9:53:56  A BALLOON, 2.75 X 15MM NC EUPHORA 15MM was inserted over WIRE, LUGE 182CM 182CM via the Fem 
Art (right).

      A BALLOON, 2.75 X 15MM NC EUPHORA 15MM over a WIRE, LUGE 182CM 182CM in the LAD Mid was inflate
d using a

9:55:14

      30 MICKI INDEFLATOR at 18 micki for 26 sec.

      A BALLOON, 2.75 X 15MM NC EUPHORA 15MM over a WIRE, LUGE 182CM 182CM in the LAD Mid was inflate
d using a

9:56:05

      30 MICKI INDEFLATOR at 22 micki for 45 sec.

9:57:14  Balloon Removed.

 

9:58:16  Wire removed

 

9:58:35  HR=59 bpm, HKNN=609/76 mmhg, SpO2=93.0 %, Resp=17 B/min, Pain=0, Tamara=10, Lazaro=2

 

9:58:57  Catheter was removed

 

10:00:43  An injection in the Fem Art (right) was made through the SHEATH, FR6.5 PRELUDE 11CM FR 6.5.


 

10:02:32  VASCADE, FR6 CLOSURE SYSTEM FR 6\7 placement in the Fem Art (right)

 

10:03:26  Activated Clotting Time Drawn

 

10:03:36  HR=59 bpm, QSUX=452/79 mmhg, SpO2=95 %, Resp=14 B/min, Pain=0, Tamara=10, Lazaro=2

 

10:06:12  Case End

      Assessment: Final Case, HR=56 BPM, Rhythm=sr, HAXF=411/72 mmhg, Edema=Mild, Color=Normal, Skin 
= Warm,

      Dry

      Right Pulses: César Ped=2, Femoral=2

10:07:19

      Left Pulses: César Ped=2, Femoral=2

      Neurological: State=Alert, Ox3, BAH

      Respiration: Resp=19 B/min, SpO2=97 %

10:07:25  Catheter(s) removed without difficulty

 

10:07:42  600 mg PLAVIX given in lab by Seven Blanc RN via Oral. Ordered by Zay Cortes.

 

10:07:56  No case complications noted.

 

10:07:58  Sterile dressing applied to site

 

10:07:59  Cine recording checked.

 

10:08:01  Bedside Report will be given.

 

10:08:04  A Left and Right Heart Cath was performed.

 

10:08:06  Implantable Device card placed in patient's chart.

 

10:08:37  HR=62 bpm, RTSG=977/77 mmhg, SpO2=93.0 %, Resp=12 B/min, Pain=0, Tamara=10, Lazaro=2

 

10:09:42  ACT (Normal Range ) = 334

 

10:09:56  venous Sheath removed; pressure applied to access site.

 

10:13:40  HR=57 bpm, QOYV=562/77 mmhg, SpO2=94.0 %, Resp=11 B/min, Pain=0, Tamara=10, Lazaro=2

 

10:18:35  Vitals capture stopped.

 

10:21:17  Patient moved to stretcher

End Study - Contrast Media Used In Study

Contrast      Total Opened (mL)  Total Used (mL)     Total Wasted (mL)

 

Omnipaque      125         125           0

 

End Study - Maximum Contrast Load

Max Contrast Load (mL)

 

431.5

 

End Study - Radiation Exposure

Fluoro Time

(minutes)

20.4

 

End Study - Sheaths

Sheaths Pulled By            Sheath Hold Time (min)

 

Zay Cortes

 

 

End Study - Patient Disposition

Complications    Transferred To       Interventional Outcome

 

No         Telemetry Bed       No attempt made

## 2018-05-17 NOTE — MA
cc:

Zay Cortes MD

****

 

 

DATE:

05/17/2018

 

REFERRING PHYSICIAN:

Dr. Hossein Hughes

 

DATE OF PROCEDURE:

05/17/2018.

 

PROCEDURE

1.  Left heart catheterization.

2.  Coronary arteriography.

3.  Percutaneous transluminal coronary angioplasty with drug-eluting 

stent implant mid-LAD.

4.  Right femoral angiography.

5.  Vascade closure device right femoral artery.

 

PROCEDURE TECHNIQUE:

The patient was brought to the cardiac catheterization laboratory and 

the area of the right groin prepped and draped in usual sterile 

manner.  Following 15 mL of 1% Xylocaine local anesthesia, the right 

femoral artery was entered using an 18-gauge needle with the use of 

fluoroscopic guidance for positioning.  A 6-Guinean short introducer 

sheath was placed in the right femoral artery and a 7 Guinean sheath 

placed in right femoral vein via the modified Seldinger technique.  

Through the venous introducer sheath, a 6-Guinean [place]ia 

thermodilution catheter was passed to the right heart and pressures 

measured in the right atrium, right ventricle, pulmonary artery and 

pulmonary capillary wedge positions.  Triplicate thermodilution 

cardiac outputs were performed.  Oxygen saturations were drawn from 

the pulmonary artery, right ventricle, high, mid and low right atrium 

as well as the aorta and this catheter was then removed.

 

Attention was turned to the left heart study.  A 6-Guinean, JL4.5 

diagnostic catheter, a 3DRC catheter was used for the left heart 

study.  Multiple projections of the left and right coronaries were 

taken.  Left ventriculogram was not performed in the interest of 

reducing contrast load due to renal insufficiency.  Following the 

diagnostic study, significant stenosis was seen in 2 vessels including

the LAD and the posterior descending branch of the right coronary 

artery and it was decided to proceed with intervention.

 

The diagnostic catheters were removed and replaced by a 6-Guinean XP 

3.5 guide catheter which was passed to the left coronary artery.  The 

patient was given multiple doses of heparin to maintain an ACT of 300 

seconds.  Through the guiding catheter, a loose 0.014 floppy wire was 

passed into the left coronary artery and placed distally in the LAD.  

Over this wire , a Euphora NC 2.5 x 12 mm balloon was passed across 

the stenosis in the mid-LAD and inflated multiple times to a maximum 

pressure of 14 atmospheres for up to 1 minute.  This balloon catheter 

was deflated and removed.  An Stiven 2.5 x 18 mm stent was then deployed

across the stenosis in the mid-LAD at a maximum pressure of 16 

atmospheres for 45 seconds.  This balloon deployment catheter was 

deflated and removed.  Followup inflations were again performed with 

the Euphoria NC 2.5 x 12 mm balloon catheter which was taken to 20 

atmospheres, but because of still inadequate expansion of the stent in

the mid-portion, it was decided to up-size this balloon to a Euphora 

NC 2.75 x 15 mm balloon which was taken to 20 atmospheres for 35 

seconds.  This post-dilatation balloon catheter was deflated and 

removed and final pictures were taken with and without the guidewire 

in place.  A good result was seen.  The guiding catheter was removed. 

It was decided not to proceed directly with intervention to the 

posterior descending branch of the right coronary artery at this time 

to conserve contrast load due to renal insufficiency.  Possible plan 

is for a staged procedure in a couple of weeks.

 

HEMODYNAMIC DATA:

RIGHT HEART PRESSURES:

Right atrium:  A wave equals 20, V wave equals 10, mean equals 10 

mmHg.

Right ventricle:  32/9 mmHg.

Pulmonary capillary wedge pressure:  A wave equals 13, V wave equals 

13, mean equals 11 mmHg.

Pulmonary artery pressure:  29/15 mmHg with a mean pulmonary artery 

pressure of 20 mmHg.

The oxygen saturations showed no evidence of shunting or step-up.

 

LEFT HEART:

The left ventricle was not entered.

Systemic pressure 123/56 mmHg with a mean arterial pressure of 81 

mmHg.

For complete hemodynamic details, please see accompanying paperwork.

 

ANGIOGRAPHIC FINDINGS:

LEFT VENTRICLE:  Not performed.

 

LEFT CORONARY ARTERY:  Left main trunk:  The left main trunk arises 

normally from the left coronary sinus.  There is only mild luminal 

irregularities.  This is a moderate-to-large caliber vessel.

 

LEFT ANTERIOR DESCENDING ARTERY:  The LAD is a moderate-sized vessel, 

whose course ends prior to the apex.  There is 1 major septal 

perforating branch and a small diagonal branch.  This vessel has a 

10-15% narrowing in the proximal segment, followed by a 95% stenosis 

in the mid-segment just beyond the major septal perforating branch.  

There is diffuse disease in the distal segment, as it approaches the 

apex with very small vessel in this region.  The diagonal is small in 

caliber with mild irregularities.

 

RAMUS INTERMEDIUS:  Moderate to large caliber vessel.  Moderate 

luminal irregularities, less than 10% stenosis.

 

CIRCUMFLEX:  Small caliber vessel.  Nondominant.  Moderate diffuse 

disease with up to 40-50% stenosis.

 

RIGHT CORONARY ARTERY:  Dominant.  Right coronary artery is a large 

caliber vessel.  There is 10-15% narrowing in the mid-segment.  It 

gives rise to a right ventricular marginal branch, posterior 

descending branch, and posterior ventricular branch.  The RV branch is

small in caliber and normal.  The posterior descending branch is small

to medium in caliber with a 95% stenosis in its mid-segment.  The 

posterior ventricular branch is moderate in caliber.  There are 2 

posterior ventricular branches.  The first one is moderate in caliber 

with mild luminal irregularities and areas of up to 20-25% narrowing 

and a second posterior ventricular branch which is small in caliber 

with mild diffuse luminal irregularities.

 

ANGIOGRAPHIC RESULTS:

In the mid-portion of the left anterior descending artery, a vessel of

approximately 2.75 mm in caliber, there is a type B2 lesion 

obstructing the lumen by up to 95% of its diameter.  Following balloon

inflations and subsequent stent implant and followup balloon 

inflations, there is 0% residual luminal diameter stenosis.  No 

intimal disruption is seen and distal flow was ATA grade 2 prior to 

the procedure, and ATA grade 3 at completion of the procedure.  There

remained some small vessel diffuse distal LAD disease noted near the 

apex.

 

DIAGNOSES:

1.  Dyspnea on exertion as anginal equivalent.

2.  Severe two-vessel coronary atherosclerosis.

3.  Normal left ventricular function by recent echocardiography.

4.  Normal right heart pressures.

5.  Successful percutaneous coronary intervention/drug-eluting stent 

implant mid-left anterior descending.

 

COMMENTS/RECOMMENDATIONS:

Angiographically, this patient had an excellent result after stenting 

of his mid-LAD today.  He does have significant disease in a small to 

medium-sized posterior descending branch which will be treated 

medically for now.  If his symptoms do not improve, we will plan a 

staged procedure and returned to the Cath Lab in a few weeks for 

intervention to the RCA/PDA.  The patient has been loaded on Plavix 

today.  If ambulatory and stable later today, he will be discharged to

home, otherwise he will be observed overnight.

 

 

__________________________________

MD PAULY Tolentino/NAKIA

D: 05/17/2018, 10:25 AM

T: 05/17/2018, 11:12 AM

Visit #: [id number]

Job #: [id number]

## 2023-06-12 NOTE — PD
Data


Data


Last Documented VS





Vital Signs








  Date Time  Temp Pulse Resp B/P Pulse Ox O2 Delivery O2 Flow Rate FiO2


 


3/13/17 16:44  74 18 148/76 98 Room Air  


 


3/13/17 13:09 97.9       








Orders





 Us Leg Venous Doppler (3/13/17 )


Complete Blood Count With Diff (3/13/17 14:34)


Basic Metabolic Panel (Bmp) (3/13/17 14:34)





Labs





 Laboratory Tests








Test 3/13/17





 15:35


 


White Blood Count 5.9 TH/MM3


 


Red Blood Count 4.70 MIL/MM3


 


Hemoglobin 14.1 GM/DL


 


Hematocrit 40.6 %


 


Mean Corpuscular Volume 86.3 FL


 


Mean Corpuscular Hemoglobin 29.9 PG


 


Mean Corpuscular Hemoglobin 34.7 %





Concent 


 


Red Cell Distribution Width 12.9 %


 


Platelet Count 283 TH/MM3


 


Mean Platelet Volume 7.2 FL


 


Neutrophils (%) (Auto) 58.2 %


 


Lymphocytes (%) (Auto) 32.3 %


 


Monocytes (%) (Auto) 6.6 %


 


Eosinophils (%) (Auto) 1.9 %


 


Basophils (%) (Auto) 1.0 %


 


Neutrophils # (Auto) 3.5 TH/MM3


 


Lymphocytes # (Auto) 1.9 TH/MM3


 


Monocytes # (Auto) 0.4 TH/MM3


 


Eosinophils # (Auto) 0.1 TH/MM3


 


Basophils # (Auto) 0.1 TH/MM3


 


CBC Comment DIFF FINAL 


 


Differential Comment  


 


Sodium Level 138 MEQ/L


 


Potassium Level 4.1 MEQ/L


 


Chloride Level 104 MEQ/L


 


Carbon Dioxide Level 24.8 MEQ/L


 


Anion Gap 9 MEQ/L


 


Blood Urea Nitrogen 27 MG/DL


 


Creatinine 1.47 MG/DL


 


Estimat Glomerular Filtration 48 ML/MIN





Rate 


 


Random Glucose 171 MG/DL


 


Calcium Level 9.2 MG/DL











MDM


Supervised Visit with ZOE:  Yes


Narrative Course


Patient care assumed by me from Maria Garcia PA-C.  Patient was seen as part of 

provider in triage screening.


Agree with the ZOE documentation.





This is a 67-year-old male presents emergency Department with nontender 

bilateral lower extremity mild edema.  Patient had been treated by cellulitis 

and states that the redness of his left lower extremity has gotten significant 

better since completing the antibiotics.  He states the swelling has not gone 

away and this is what he came in for evaluation for today.  BMP and CBC was 

obtained as part of provider in triage screening is within normal limits.  

Patient has no shortness of breath no change in skin color no history of liver 

kidney or heart disease.  Ultrasound was obtained of his left lower extremities 

and shows no evidence of DVT.


Last 24 hours Impressions








Lower Extremity Ultrasound 3/13/17 0000 Signed





Impressions: 





 Service Date/Time:  Monday, March 13, 2017 14:42 - CONCLUSION: No DVT.     

Semaj Herman MD 








GENERAL: Well-developed well-nourished apparent distress


SKIN: Warm and dry.  No saline his no erythema bilateral lower extremity's.  

Skin is intact.


HEAD: Atraumatic. Normocephalic. 


EYES: Pupils equal and round. No scleral icterus. No injection or drainage. 


ENT: No nasal bleeding or discharge.  Mucous membranes pink and moist.


NECK: Trachea midline. No JVD. 


CARDIOVASCULAR: Regular rate and rhythm.  No murmur appreciated.


RESPIRATORY: No accessory muscle use. Clear to auscultation. Breath sounds 

equal bilaterally. 


GASTROINTESTINAL: Abdomen soft, non-tender, nondistended. Hepatic and splenic 

margins not palpable. 


MUSCULOSKELETAL: No obvious deformities. No clubbing.  No cyanosis.  There is 2

+ pitting bilateral equal edema  from the distal third of the tibia into the 

foot.  Pulses motor and sensory are intact distally in all 4 extremities.  

Homans sign is negative. 


NEUROLOGICAL: Awake and alert. No obvious cranial nerve deficits.  Motor 

grossly within normal limits. Normal speech.


PSYCHIATRIC: Appropriate mood and affect; insight and judgment normal.





I discussed with the patient that there is no evidence for end organ failure 

causing his edema, consider venous insufficiency.  On this note patient states 

that he does follow the vascular surgeon and states that his veins are 

gradually becoming more dilated and there consider doing surgery on him, he is 

not exactly sure what this diagnosis is called and on his description is hard 

for me to obtain as well.  Certainly does not sound like a AAA.  I discussed 

these probably should follow-up with his vascular surgeon at this point and 

recommend PORSCHE hose.  Discussed return to ED criteria and also follow up with 

his primary care physician.  He stable for discharge at this time.


Diagnosis





 Primary Impression:  


 Pedal edema





***Additional Instruction:


PORSCHE hose


Disposition:  01 DISCHARGE HOME


Condition:  Stable








Antwon Ramesh MD Mar 13, 2017 16:58 Spoke with stefanie, pt has PCP appt tomorrow and labs and xray to be done